# Patient Record
Sex: FEMALE | URBAN - METROPOLITAN AREA
[De-identification: names, ages, dates, MRNs, and addresses within clinical notes are randomized per-mention and may not be internally consistent; named-entity substitution may affect disease eponyms.]

---

## 2019-05-13 ENCOUNTER — APPOINTMENT (RX ONLY)
Dept: URBAN - METROPOLITAN AREA CLINIC 12 | Facility: CLINIC | Age: 59
Setting detail: DERMATOLOGY
End: 2019-05-13

## 2019-05-13 DIAGNOSIS — F42.4 EXCORIATION (SKIN-PICKING) DISORDER: ICD-10-CM

## 2019-05-13 DIAGNOSIS — L40.0 PSORIASIS VULGARIS: ICD-10-CM

## 2019-05-13 PROBLEM — S20.102A UNSPECIFIED SUPERFICIAL INJURIES OF BREAST, LEFT BREAST, INITIAL ENCOUNTER: Status: ACTIVE | Noted: 2019-05-13

## 2019-05-13 PROBLEM — S30.92XA UNSPECIFIED SUPERFICIAL INJURY OF ABDOMINAL WALL, INITIAL ENCOUNTER: Status: ACTIVE | Noted: 2019-05-13

## 2019-05-13 PROCEDURE — ? INVENTORY

## 2019-05-13 PROCEDURE — 99213 OFFICE O/P EST LOW 20 MIN: CPT

## 2019-05-13 PROCEDURE — ? PRESCRIPTION MEDICATION MANAGEMENT

## 2019-05-13 PROCEDURE — ? COUNSELING

## 2019-05-13 PROCEDURE — ? PRESCRIPTION

## 2019-05-13 RX ORDER — CALCIPOTRIENE AND BETAMETHASONE DIPROPIONATE 50; .5 UG/G; MG/G
AEROSOL, FOAM TOPICAL
Qty: 1 | Refills: 2 | Status: ERX | COMMUNITY
Start: 2019-05-13

## 2019-05-13 RX ADMIN — CALCIPOTRIENE AND BETAMETHASONE DIPROPIONATE: 50; .5 AEROSOL, FOAM TOPICAL at 17:39

## 2019-05-13 ASSESSMENT — LOCATION DETAILED DESCRIPTION DERM
LOCATION DETAILED: PERIUMBILICAL SKIN
LOCATION DETAILED: LEFT LATERAL BREAST 5-6:00 REGION

## 2019-05-13 ASSESSMENT — BSA PSORIASIS: % BODY COVERED IN PSORIASIS: 3

## 2019-05-13 ASSESSMENT — LOCATION SIMPLE DESCRIPTION DERM
LOCATION SIMPLE: ABDOMEN
LOCATION SIMPLE: LEFT BREAST

## 2019-05-13 ASSESSMENT — LOCATION ZONE DERM: LOCATION ZONE: TRUNK

## 2019-05-13 NOTE — HPI: RASH (PSORIASIS)
How Severe Is Your Psoriasis?: moderate
Do You Have A Family History Of Psoriasis?: no
Is This A New Presentation, Or A Follow-Up?: Psoriasis
Additional History: Declines fbe

## 2019-05-13 NOTE — PROCEDURE: COUNSELING
Patient Specific Counseling (Will Not Stick From Patient To Patient): Wound care.
Detail Level: Detailed

## 2023-02-21 ENCOUNTER — APPOINTMENT (RX ONLY)
Dept: URBAN - METROPOLITAN AREA CLINIC 12 | Facility: CLINIC | Age: 63
Setting detail: DERMATOLOGY
End: 2023-02-21

## 2023-02-21 DIAGNOSIS — L40.0 PSORIASIS VULGARIS: ICD-10-CM | Status: INADEQUATELY CONTROLLED

## 2023-02-21 PROCEDURE — ? ADDITIONAL NOTES

## 2023-02-21 PROCEDURE — ? PRESCRIPTION

## 2023-02-21 PROCEDURE — 99204 OFFICE O/P NEW MOD 45 MIN: CPT

## 2023-02-21 PROCEDURE — ? COUNSELING

## 2023-02-21 RX ORDER — CALCIPOTRIENE AND BETAMETHASONE DIPROPIONATE 50; .5 UG/G; MG/G
AEROSOL, FOAM TOPICAL
Qty: 60 | Refills: 1 | Status: ERX | COMMUNITY
Start: 2023-02-21

## 2023-02-21 RX ADMIN — CALCIPOTRIENE AND BETAMETHASONE DIPROPIONATE: 50; .5 AEROSOL, FOAM TOPICAL at 00:00

## 2023-02-21 ASSESSMENT — BSA PSORIASIS: % BODY COVERED IN PSORIASIS: 1

## 2023-02-21 ASSESSMENT — PGA PSORIASIS: PGA PSORIASIS 2020: MILD

## 2023-02-21 ASSESSMENT — ITCH NUMERIC RATING SCALE: HOW SEVERE IS YOUR ITCHING?: 5

## 2023-02-21 ASSESSMENT — LOCATION ZONE DERM
LOCATION ZONE: EAR
LOCATION ZONE: SCALP

## 2023-02-21 ASSESSMENT — LOCATION SIMPLE DESCRIPTION DERM
LOCATION SIMPLE: RIGHT EAR
LOCATION SIMPLE: POSTERIOR SCALP
LOCATION SIMPLE: LEFT EAR

## 2023-02-21 ASSESSMENT — LOCATION DETAILED DESCRIPTION DERM
LOCATION DETAILED: MID-OCCIPITAL SCALP
LOCATION DETAILED: RIGHT CRUS OF HELIX
LOCATION DETAILED: LEFT CRUS OF HELIX

## 2023-02-21 NOTE — PROCEDURE: MIPS QUALITY
Quality 402: Tobacco Use And Help With Quitting Among Adolescents: Patient screened for tobacco and is a smoker AND received Cessation Counseling
Quality 130: Documentation Of Current Medications In The Medical Record: Current Medications Documented
Detail Level: Detailed
Quality 431: Preventive Care And Screening: Unhealthy Alcohol Use - Screening: Patient not identified as an unhealthy alcohol user when screened for unhealthy alcohol use using a systematic screening method
Quality 226: Preventive Care And Screening: Tobacco Use: Screening And Cessation Intervention: Patient screened for tobacco use, is a smoker AND received Cessation Counseling within the Previous 12 Months

## 2023-02-21 NOTE — PROCEDURE: ADDITIONAL NOTES
Additional Notes: information for prior auth:  has been on enstilar for years with good results
Render Risk Assessment In Note?: no
Detail Level: Simple

## 2023-03-07 ENCOUNTER — RX ONLY (OUTPATIENT)
Age: 63
Setting detail: RX ONLY
End: 2023-03-07

## 2023-03-07 RX ORDER — CALCIPOTRIENE AND BETAMETHASONE DIPROPIONATE 50; .5 UG/G; MG/G
AEROSOL, FOAM TOPICAL
Qty: 60 | Refills: 1 | Status: ACTIVE

## 2023-03-07 RX ORDER — CLOBETASOL PROPIONATE 0.5 MG/ML
SOLUTION TOPICAL
Qty: 50 | Refills: 3 | Status: ERX | COMMUNITY
Start: 2023-03-07

## 2025-01-30 NOTE — HPI: RASH
2025    From the office of:   Terry Coulter MD   [facility] [address]  [address]  [address]  [facility] [address]  Phone: [phone]  Fax: [phone]    In regards to Julia Araujo, :  1962    Chief complaint:   Chief Complaint   Patient presents with    Follow-up     1 year     Vitals:  Visit Vitals  /75 (BP Location: RUE - Right upper extremity, Patient Position: Sitting, Cuff Size: Regular)   Pulse 89   Ht 5' 3\" (1.6 m)   Wt 76.2 kg (168 lb)   LMP 2017 (Approximate)   SpO2 95%   BMI 29.76 kg/m²     HISTORY OF PRESENT ILLNESS     Julia Palacios is a 59 y/o female who presents to reestablish cardiac care.  She was last evaluated in . She was initially seen for evaluation of palpitations and evidence of a mildly dilated ascending aorta noticed on echocardiogram. There was report of an interval enlargement (2.7 to 3.6 cm) between the TTE dated 2011 and 2016. CTA was completed on 16 showing a normal caliber thoracic and abdominal aorta. Her coronary arteries were free of calcification. She has a brother that had his first MI in his 30's and  at the age of 46. She has a history of tobacco abuse and reported recently quitting.  Additional history of hyperlipidemia and former tobacco use (quit 1970s).    She was seen in follow-up on 10/1/2019. She reported she wanted to come in just to be \"checked out\". She does not challenge herself regularly. 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 She does report intermittent palpitations. They are random with no specific provoking or relieving factors. They are infrequent, usually occurring once a week. She reported these are unchanged in nature from the symptoms she reported in 2016 and have been ongoing for 15 years. They last <1 minute in duration. No associated symptoms. She does report intermittent 
lightheadedness but no specific provocation.     She was seen in follow-up on 12/2/2021. She stated she had been feeling well. She had occasional palpitations/heart racing at rest for only a few seconds. She denied chest pain, shortness of breath, or dizziness. She had occasionally complained of a pain in the calf. She becomes concerned as her brother had a DVT. She stated her brother passed away last year from a CVA.     She was seen in clinic on 8/17/2023 for a follow up visit.  She was doing well from a cardiac standpoint.  Since her last visit, she stopped smoking, which was a big accomplishment for her.  She stayed active in her daily life by working and completing daily activities, but she did not exercise.  She did not have any chest pain, shortness of breath, heart palpitations, dizziness or LE swelling.  She was not taking her Atorvastatin due to intolerance.  She wasn't interested in treating her cholesterol.  She tolerated her medications well.      She was seen in follow up on 1/31/24. At that time, she reported some site reactions with Repatha that caused a red, itchy patch. These symptoms appeared a few months ago after she had already been on Repatha for some time. She endorsed some occasional palpitations that lasted less than a minute. She denied chest pain or significant JUAREZ. She was compliant with all cardiac medications. No other concerns noted at this time.     She presented for a follow up visit on 01/30/2025. She was doing well from a cardiac standpoint. She hasn't had any significant recurrent episodes of palpitations. She denied any anginal type chest pains or dyspnea. She noted she hasn't missed an injection for her Repatha, so she is unsure on why her cholesterol levels have been up and down recently. She's had no complications with the injections. No other cardiac concerns reported.     Other significant problems:  Patient Active Problem List    Diagnosis Date Noted    Trochanteric 
bursitis of right hip 03/28/2024     Priority: Low    Coronary artery calcification 12/02/2021     Priority: Low     EKG/Treadmill Stress Test 10/11/2019:  Nonischemic response electrocardiographically   Nonischemic response subjectively   Normal blood pressure response to exercise   Normal heart rate recovery   Good/Excellent exercise capacity, achieving 11.7 METS     CT Calcium Score 2/5/2020:  FINDINGS:  Total Agatston Coronary Calcium Score: 13  Percentile: 80th  LMA: 18  LAD: 0  LCX: 0  RCA: 0  Ascending Aorta: 3.5 cm  Descending Aorta: 2.2 cm  Main Pulmonary Artery: 2.3 cm  Extra-coronary vascular calcification: There is very mild calcification in  the thoracic aorta  Pericardium: Normal.  IMPRESSION:  1.  Total coronary artery calcium score is 13. 80% of people matched for  age, gender, and race/ethnicity who are free of clinical cardiovascular  disease and treated diabetes had less calcium than was detected in this  study.  2.  There is very mild calcification in the thoracic aorta       Familial hyperlipidemia 12/02/2021     Priority: Low     5/24/2017 Lipid Panel: Chol 202, , HDL 50, .   4/4/2018 Lipid Panel: Chol 219, , HDL 46, .  12/19/2019 Lipid Panel: Chol 223, , HDL 49, .  2/25/2020 Lipid Panel: Chol 167, , HDL 51, TG 65.  5/27/2020 Lipid Panel: Chol 216, , HDL 48, .  12/16/2020 Lipid Panel: Chol 295, , HDL 55, .  10/4/2021 Lipid Panel: Chol 191, , HDL 50, TG 87.  8/03/2023 Lipid Panel: Chol 255, , HDL 47,   10/30/2024 Lipid Panel: Chol 259, , HDL 57, .       Hematuria 11/18/2019     Priority: Low     Needs repeat testing Oct 2020      Borderline ascending aorta dilatation 04/22/2016     Priority: Low     Echo 2/24/2016:  Normal biventricular size and systolic function, no LV regional wall motion abnormalities; LVEF= 58%.  Borderline LVH with normal diastolic function.  Mild biatrial enlargement, 
LA volume index= 40 ml/m².  No significant valve abnormalities.  Borderline dilated ascending aorta (3.6 cm).    CT Calcium Score 2/5/2020:  Ascending Aorta: 3.5 cm      Palpitations      Priority: Low    History of basal cell carcinoma on right anterior arm excised 09/2015 09/14/2015     Priority: Low    HAV (hallux abducto valgus) 09/14/2015     Priority: Low    HA (headache) 02/28/2015     Priority: Low    Anxiety      Priority: Low    History of hyperthyroidism      Priority: Low     Treated with radioactive iodine      Hypothyroidism (acquired)      Priority: Low    Esophageal reflux      Priority: Low       PAST MEDICAL, FAMILY AND SOCIAL HISTORY     Medications:  Current Outpatient Medications   Medication Sig Dispense Refill    ALPRAZolam (XANAX) 1 MG tablet Take 1 tablet by mouth in the morning and 1 tablet in the evening. Indications: Feeling Anxious. 60 tablet 3    EVOLocumab (REPATHA SURECLICK) 140 MG/ML injection Inject 1 pen into the skin every 14 days. 2 mL 11    ferrous sulfate 325 (65 FE) MG tablet Take 325 mg by mouth daily (with breakfast).      ibuprofen (MOTRIN) 600 MG tablet Take 1 tablet by mouth every 8 hours as needed for Pain. 30 tablet 0    levothyroxine 100 MCG tablet Take 1 tablet by mouth daily. 90 tablet 3    sertraline (ZOLOFT) 100 MG tablet Take 1 tablet by mouth daily. 90 tablet 3    omeprazole (PriLOSEC) 40 MG capsule Take 1 capsule by mouth daily. 90 capsule 3    albuterol 108 (90 Base) MCG/ACT inhaler Inhale 2 puffs into the lungs every 4 hours as needed for Shortness of Breath or Wheezing. 8.5 g 3    estradiol (ESTRACE) 0.1 MG/GM vaginal cream Place 1 g vaginally 2 days a week. 42.5 g 5    cetirizine (ZyrTEC) 10 MG tablet Take 1 tablet by mouth daily. 90 tablet 1    tiZANidine (ZANAFLEX) 4 MG tablet Take 1 tablet by mouth every 8 hours as needed (neck pain). 15 tablet 0    triamcinolone (ARISTOCORT) 0.1 % ointment Apply to area of psoriasis on the forehead and eyelids twice a 
What Type Of Note Output Would You Prefer (Optional)?: Bullet Format
day for up to 2 weeks.  If it does not clear with this you need a follow-up appointment 30 g 1    fluticasone (FLONASE) 50 MCG/ACT nasal spray Spray 1 spray in each nostril 2 times daily. 16 g 1    clobetasol (TEMOVATE) 0.05 % topical solution Apply 2 times daily to the scalp for 5 days, then 2-3 times weekly to maintain effect. Place 5-6 drops on scalp (best if scalp wet), then rub in. 50 mL 2    polyethylene glycol (MIRALAX) 17 GM/SCOOP powder Take 17 g by mouth daily. Stir and dissolve powder in any 4 to 8 ounces of beverage, then drink. 238 g 11    sharps container use as directed 1 each 0    Alcohol Swabs (B-D SINGLE USE SWABS REGULAR) Pads use as directed 30 each 0    azelastine (ASTELIN) 0.1 % nasal spray Spray 1 spray in each nostril in the morning and 1 spray in the evening. 30 mL 12    ibuprofen (MOTRIN) 800 MG tablet Take 1 tablet by mouth in the morning and 1 tablet in the evening with food. Please make an appointment for refills. (Patient not taking: Reported on 1/30/2025) 60 tablet 0    valACYclovir (VALTREX) 500 MG tablet Take 1 tablet by mouth 2 times every day as needed. 60 tablet 6    ketoconazole (NIZORAL) 2 % cream Apply 1 application topically 2 times daily. 30 g 0    triamcinolone (ARISTOCORT) 0.1 % ointment Apply 1 application topically 2 times daily. Until itching and redness is gone 30 g 0    lidocaine (LIDODERM) 5 % Place 1 patch onto the skin every 24 hours. Remove patch 12 hours after applying 30 patch 1    Biotin 10 MG Tab       Omega-3 Fatty Acids (FISH OIL) 1000 MG capsule Take 2 g by mouth daily. 90 capsule 3     No current facility-administered medications for this visit.     Allergies:  ALLERGIES:  No Known Allergies    Past Medical  History/Surgeries:  Past Medical History:   Diagnosis Date    Anxiety     Arthritis     Chronic pain     right tennis elbow    Depression     Esophageal reflux     Familial hyperlipidemia 12/02/2021    History of basal cell carcinoma on right anterior 
How Severe Is Your Rash?: moderate
arm excised 2015     History of hyperthyroidism     Treated with radioactive iodine    Hypothyroidism (acquired)     Murmur     Since childhood    Sinusitis, chronic        Past Surgical History:   Procedure Laterality Date    Colonoscopy diagnostic  10/06/2012    10 Year Recall/ Normal/ Dr. Ritter    Colonoscopy diagnostic  2023    10YR RECALL    Esophagogastroduodenoscopy transoral flex diag  2023    Hysteroscopy, diagnostic  2015    Mandible fracture surgery  1983    Tubal ligation  1998       Family History:  Family History   Problem Relation Age of Onset    Cancer, Throat Father         throat CA, head/neck    Cancer Sister 67        metastatic unknown origin    Heart Brother     Heart disease Brother     Stroke Brother 72    Thyroid Brother     Cancer, Prostate Brother 60    Stroke Brother     Heart Brother     Anxiety disorder Daughter     Anxiety disorder Daughter     Heart disease Maternal Grandmother     Diabetes Maternal Grandmother     Cancer, Ovarian Maternal Aunt 60    Cancer, Lung Maternal Aunt     Atrial Fibrilliation Maternal Aunt     Cancer, Endometrial Neg Hx             Cancer, Breast Neg Hx             Cancer, Pancreatic Neg Hx             Cancer, Colon Neg Hx                Social History:  Social History     Tobacco Use    Smoking status: Former     Current packs/day: 0.00     Average packs/day: 0.5 packs/day for 46.1 years (23.0 ttl pk-yrs)     Types: Cigarettes     Start date: 11/3/1976     Quit date: 2022     Years since quittin.1    Smokeless tobacco: Never   Substance Use Topics    Alcohol use: Not Currently     Comment: Occasionally       REVIEW OF SYSTEMS     Review of Systems   Constitutional:  Negative for chills, fever and unexpected weight change.   HENT:  Negative for nosebleeds.    Respiratory:  Negative for apnea, cough, shortness of breath and wheezing.    Cardiovascular:  Negative for chest pain, palpitations and leg swelling. 
  Gastrointestinal:  Negative for abdominal distention, diarrhea, nausea and vomiting.   Endocrine: Negative for polydipsia and polyuria.   Musculoskeletal:  Negative for arthralgias and myalgias.   Skin:  Negative for rash.   Neurological:  Negative for dizziness, syncope and light-headedness.   Psychiatric/Behavioral:  The patient is not nervous/anxious.    All other systems reviewed and are negative.    PHYSICAL EXAM     Physical Exam  ASSESSMENT/PLAN     Familial hyperlipidemia  Per most recent FLP in 10/2024, LDL has increased to 172. Patient reported she is still current taking Repatha and has not missed any doses. Patient will have a nurse visit to confirm proper administration of drug. If LDL does not improve per next FLP, will consider Inclisiran    Palpitations  No significant episodes of palpitations reported per patient. Will continue to monitor.     Coronary artery calcification  Patient denied any anginal type chest pains or JUAREZ. Will continue to manage cholesterol with Repatha, possible switch to Leqvio if cholesterol values don't improve with current treatment.       Return in about 8 days (around 2/7/2025).    On 1/30/2025, IHalina, scribed the services personally performed by Terry Coulter MD  The documentation recorded by the scribe accurately and completely reflects the service(s) I personally performed and the decisions made by me.              
Is This A New Presentation, Or A Follow-Up?: Rash
Additional History: States in April patient was cutting poison ivy root and came down with a rash that is not healing and is scarring on trunk. States this rash is not itchy .